# Patient Record
Sex: FEMALE
[De-identification: names, ages, dates, MRNs, and addresses within clinical notes are randomized per-mention and may not be internally consistent; named-entity substitution may affect disease eponyms.]

---

## 2021-06-27 ENCOUNTER — NURSE TRIAGE (OUTPATIENT)
Dept: OTHER | Facility: CLINIC | Age: 61
End: 2021-06-27

## 2021-06-27 NOTE — TELEPHONE ENCOUNTER
.Brief description of triage: facial swelling    Onset 18 hrs ago  All of face progressively swelling  Lips tightening  Eyes feel \"squinty  Butterfly like rash over nose and cheeks on and off for awhile (states mask related) however hot itchy and oozing x 5 hours  Denies fever, peeling skin, blisters or streaks    Has had similar facial swelling when taking lisinopril and it was dc'd about a year ago  New class of BP med now for 6 past months    Benadryl taken 10 pm last night, (low dose) and has iced  Not helped much    Denies cp, sob, difficulty swallowing, scratchy throat, airway closure, lightheadedness, visual changes, pain, swelling on other body parts,     Used a Vit C serum on face yesterday morning. These symptoms did occur afterward  Brief encounter with stray dog yesterday touched it but washed hands immediately. Triage indicates for patient to see HCP within 4 hours  Provided in [facility] per [facility] web site per request.    Care advice provided, patient verbalizes understanding; denies any other questions or concerns; instructed to call back for any new or worsening symptoms. Go to ED/call EMS if you develop any difficulty breathing, throat closing, tongue swelling, trouble swallowing-    This triage is a result of a call to [address] [place] [address]. Please do not respond to the triage nurse through this encounter. Any subsequent communication should be directly with the patient. Reason for Disposition   SEVERE swelling of the entire face     Moderate swelling. Progressing over past 18 hours    Answer Assessment - Initial Assessment Questions  1. ONSET: \"When did the swelling start? \" (e.g., minutes, hours, days)      18 hours ago     2. LOCATION: \"What part of the face is swollen? \"   whole face    3. SEVERITY: \"How swollen is it? \"   moderate, progressively worsening over past 18 hrs     4. ITCHING: \"Is there any itching? \" If so, ask: \"How much? \"   (Scale 1-10; mild, moderate or severe)   face feels hot and itchy    5. PAIN: \"Is the swelling painful to touch? \" If so, ask: \"How painful is it? \"   (Scale 1-10; mild, moderate or severe)  No pain just hot and itchy around face mask area    6. FEVER: \"Do you have a fever? \" If so, ask: \"What is it, how was it measured, and when did it start? \"      No    7. CAUSE: \"What do you think is causing the face swelling? \"  Unsure, did use Vit C serum prior to these symptoms,  Had brief exposure to a stray dog but washed hands immediately. 8. RECURRENT SYMPTOM: \"Have you had face swelling before? \" If so, ask: \"When was the last time? \" \"What happened that time? \"    One time similar with lisinopril about a year ago- does not take anymore    9. OTHER SYMPTOMS: \"Do you have any other symptoms? \" (e.g., toothache, leg swelling)  No.  No fever. 10. PREGNANCY: \"Is there any chance you are pregnant? \" \"When was your last menstrual period? \"     no    Protocols used: [facility]